# Patient Record
Sex: MALE | Race: WHITE | NOT HISPANIC OR LATINO | URBAN - METROPOLITAN AREA
[De-identification: names, ages, dates, MRNs, and addresses within clinical notes are randomized per-mention and may not be internally consistent; named-entity substitution may affect disease eponyms.]

---

## 2023-11-05 ENCOUNTER — HOSPITAL ENCOUNTER (EMERGENCY)
Facility: CLINIC | Age: 43
Discharge: HOME | End: 2023-11-05
Attending: EMERGENCY MEDICINE | Admitting: EMERGENCY MEDICINE
Payer: COMMERCIAL

## 2023-11-05 ENCOUNTER — APPOINTMENT (OUTPATIENT)
Dept: GENERAL RADIOLOGY | Facility: CLINIC | Age: 43
End: 2023-11-05
Attending: EMERGENCY MEDICINE
Payer: COMMERCIAL

## 2023-11-05 VITALS
RESPIRATION RATE: 18 BRPM | HEART RATE: 70 BPM | OXYGEN SATURATION: 99 % | TEMPERATURE: 98.4 F | SYSTOLIC BLOOD PRESSURE: 152 MMHG | DIASTOLIC BLOOD PRESSURE: 87 MMHG

## 2023-11-05 DIAGNOSIS — L03.011 CELLULITIS OF RIGHT THUMB: ICD-10-CM

## 2023-11-05 PROCEDURE — 73130 X-RAY EXAM OF HAND: CPT | Mod: RT

## 2023-11-05 PROCEDURE — 99284 EMERGENCY DEPT VISIT MOD MDM: CPT | Performed by: EMERGENCY MEDICINE

## 2023-11-05 PROCEDURE — 99283 EMERGENCY DEPT VISIT LOW MDM: CPT | Performed by: EMERGENCY MEDICINE

## 2023-11-05 PROCEDURE — 250N000013 HC RX MED GY IP 250 OP 250 PS 637: Performed by: EMERGENCY MEDICINE

## 2023-11-05 RX ORDER — CLINDAMYCIN HCL 300 MG
300 CAPSULE ORAL 4 TIMES DAILY
Qty: 40 CAPSULE | Refills: 0 | Status: ON HOLD | OUTPATIENT
Start: 2023-11-05 | End: 2023-11-12

## 2023-11-05 RX ORDER — CLINDAMYCIN HCL 150 MG
300 CAPSULE ORAL ONCE
Status: COMPLETED | OUTPATIENT
Start: 2023-11-05 | End: 2023-11-05

## 2023-11-05 RX ORDER — HYDROXYZINE HYDROCHLORIDE 25 MG/1
50 TABLET, FILM COATED ORAL ONCE
Status: COMPLETED | OUTPATIENT
Start: 2023-11-05 | End: 2023-11-05

## 2023-11-05 RX ADMIN — CLINDAMYCIN HYDROCHLORIDE 300 MG: 150 CAPSULE ORAL at 15:28

## 2023-11-05 RX ADMIN — HYDROXYZINE HYDROCHLORIDE 50 MG: 25 TABLET, FILM COATED ORAL at 15:28

## 2023-11-05 ASSESSMENT — ENCOUNTER SYMPTOMS
ARTHRALGIAS: 1
FLANK PAIN: 0
LIGHT-HEADEDNESS: 0
CHILLS: 0
FEVER: 0
MYALGIAS: 0
NERVOUS/ANXIOUS: 1
EYE REDNESS: 0
BACK PAIN: 0
NUMBNESS: 0
HEADACHES: 0
CONFUSION: 0
NECK STIFFNESS: 0
AGITATION: 0
COUGH: 0
PALPITATIONS: 0
SORE THROAT: 0
DIARRHEA: 0
CHEST TIGHTNESS: 0
WEAKNESS: 0
NAUSEA: 0
BLOOD IN STOOL: 0
SHORTNESS OF BREATH: 0
ABDOMINAL PAIN: 0
EYE DISCHARGE: 0
SEIZURES: 0
DYSURIA: 0
VOMITING: 0
FREQUENCY: 0

## 2023-11-05 ASSESSMENT — ACTIVITIES OF DAILY LIVING (ADL)
ADLS_ACUITY_SCORE: 35
ADLS_ACUITY_SCORE: 35

## 2023-11-05 NOTE — ED PROVIDER NOTES
History     Chief Complaint   Patient presents with    Wound Check    Anxiety     HPI  Rolly Perera is a 43 year old male who is being treated at [facility] since 11/2/2023 for cocaine, opioid, and benzodiazepine abuse.  He reports a lesion on his right hand which has become increasingly red and tender.  Patient reports to me that he is freaking out because he has an infection and would like something to settle him down.  Patient did not get phenobarb earlier today due to feeling like he was overly sedated, and he is currently on Suboxone, BuSpar, and Trintellix.  He denies fever.  There is no nausea or vomiting.  There is no numbness or weakness.  He denies any injury.    Allergies:  Not on File    Problem List:    There are no problems to display for this patient.       Past Medical History:    No past medical history on file.    Past Surgical History:    No past surgical history on file.    Family History:    No family history on file.    Social History:  Marital Status:  Single [1]        Medications:    clindamycin (CLEOCIN) 300 MG capsule          Review of Systems   Constitutional:  Negative for chills and fever.   HENT:  Negative for sore throat.    Eyes:  Negative for discharge and redness.   Respiratory:  Negative for cough, chest tightness and shortness of breath.    Cardiovascular:  Negative for chest pain, palpitations and leg swelling.   Gastrointestinal:  Negative for abdominal pain, blood in stool, diarrhea, nausea and vomiting.   Genitourinary:  Negative for dysuria, flank pain and frequency.   Musculoskeletal:  Positive for arthralgias. Negative for back pain, myalgias and neck stiffness.   Skin:  Negative for pallor.   Neurological:  Negative for seizures, weakness, light-headedness, numbness and headaches.   Psychiatric/Behavioral:  Negative for agitation and confusion. The patient is nervous/anxious.        Physical Exam   BP: (!) 156/80  Pulse: 74  Temp: 97.7  F (36.5  C)  Resp: 16  SpO2:  100 %      Physical Exam  Vitals and nursing note reviewed.   Constitutional:       General: He is not in acute distress.     Appearance: He is well-developed. He is not diaphoretic.   HENT:      Head: Normocephalic and atraumatic.   Eyes:      General: No scleral icterus.     Pupils: Pupils are equal, round, and reactive to light.   Cardiovascular:      Rate and Rhythm: Normal rate and regular rhythm.      Heart sounds: Normal heart sounds. No murmur heard.  Pulmonary:      Effort: No respiratory distress.      Breath sounds: No stridor. No wheezing or rales.   Abdominal:      General: Bowel sounds are normal.      Palpations: Abdomen is soft.      Tenderness: There is no abdominal tenderness.   Musculoskeletal:         General: Tenderness present.        Hands:       Cervical back: Normal range of motion and neck supple.   Skin:     General: Skin is warm and dry.      Coloration: Skin is not pale.      Findings: No erythema or rash.   Neurological:      Mental Status: He is alert and oriented to person, place, and time.      Sensory: No sensory deficit.      Coordination: Coordination normal.         ED Course                 Procedures                Results for orders placed or performed during the hospital encounter of 11/05/23 (from the past 24 hour(s))   XR Hand Right G/E 3 Views    Narrative    EXAM: XR HAND RIGHT G/E 3 VIEWS  LOCATION: [facility]  DATE: 11/5/2023    INDICATION: Infection right hand  COMPARISON: None.      Impression    IMPRESSION: Normal joint spaces and alignment. No acute fracture. No radiographic evidence for osteomyelitis.       Medications   hydrOXYzine (ATARAX) tablet 50 mg (50 mg Oral $Given 11/5/23 1528)   clindamycin (CLEOCIN) capsule 300 mg (300 mg Oral $Given 11/5/23 1528)       Assessments & Plan (with Medical Decision Making)     I have reviewed the nursing notes.    I have reviewed the findings, diagnosis, plan and need for follow up with the  patient.    43 year old male who is being treated at [facility] since 11/2/2023 for cocaine, opioid, and benzodiazepine abuse.  He reports a lesion on his right hand which has become increasingly red and tender.  Patient reports to me that he is freaking out because he has an infection and would like something to settle him down.  Patient did not get phenobarb earlier today due to feeling like he was overly sedated, and he is currently on Suboxone, BuSpar, and Trintellix.     Patient blood pressure is 156/80 with a temperature 97.7 and pulse rate of 74.  Respirations are 16 with O2 saturations 100%.  He appears mildly anxious.  His right thumb at the IP joint has a blood blister with surrounding erythema and lymphangitic spread.  He has good distal CMS.    An x-ray was performed and independently read by me.  There is no evidence of foreign body or osteomyelitis.  There is no fracture.    Patient was given hydroxyzine and clindamycin.  He will continue on antibiotics.  He will return if he becomes more systemically ill.  [facility] can manage his withdrawal symptoms and anxiety    Medical Decision Making  The patient's presentation was of moderate complexity (an undiagnosed new problem with uncertain diagnosis).    The patient's evaluation involved:  review of 1 test result(s) ordered prior to this encounter (see separate area of note for details)    The patient's management necessitated moderate risk (prescription drug management including medications given in the ED).        New Prescriptions    CLINDAMYCIN (CLEOCIN) 300 MG CAPSULE    Take 1 capsule (300 mg) by mouth 4 times daily for 10 days       Final diagnoses:   Cellulitis of right thumb       11/5/2023   [facility] EMERGENCY DEPT       Zeeshan Campbell MD  11/05/23 0216

## 2023-11-05 NOTE — DISCHARGE INSTRUCTIONS
Warm soaks of the right hand for 15 minutes 3 times a day  Take the antibiotic as prescribed  Tylenol or ibuprofen as needed for discomfort

## 2023-11-05 NOTE — ED TRIAGE NOTES
Pt sent from [facility] to go to  to get a wound on his finger looked at.  provider states that he's not acting right and was sent to ER. Called [facility] who states that he hsa been there since 11/2 for opiate, benzo and cocaine withdrawal. He has taken his suboxone, buspar and trintelix but did not get any phenobarb today d/t sedation

## 2023-11-09 ENCOUNTER — HOSPITAL ENCOUNTER (EMERGENCY)
Facility: CLINIC | Age: 43
Discharge: HOME | End: 2023-11-09
Payer: COMMERCIAL

## 2023-11-09 VITALS
HEART RATE: 82 BPM | TEMPERATURE: 97.8 F | DIASTOLIC BLOOD PRESSURE: 74 MMHG | WEIGHT: 109 LBS | OXYGEN SATURATION: 98 % | SYSTOLIC BLOOD PRESSURE: 120 MMHG | RESPIRATION RATE: 18 BRPM

## 2023-11-09 DIAGNOSIS — L02.519 CELLULITIS AND ABSCESS OF HAND: ICD-10-CM

## 2023-11-09 DIAGNOSIS — L03.119 CELLULITIS AND ABSCESS OF HAND: ICD-10-CM

## 2023-11-09 PROCEDURE — 10060 I&D ABSCESS SIMPLE/SINGLE: CPT

## 2023-11-09 PROCEDURE — 99213 OFFICE O/P EST LOW 20 MIN: CPT | Mod: 25

## 2023-11-09 PROCEDURE — G0463 HOSPITAL OUTPT CLINIC VISIT: HCPCS | Mod: 25

## 2023-11-09 RX ORDER — SULFAMETHOXAZOLE/TRIMETHOPRIM 800-160 MG
1 TABLET ORAL 2 TIMES DAILY
Qty: 14 TABLET | Refills: 0 | Status: ON HOLD | OUTPATIENT
Start: 2023-11-09 | End: 2023-11-12

## 2023-11-09 ASSESSMENT — ACTIVITIES OF DAILY LIVING (ADL): ADLS_ACUITY_SCORE: 35

## 2023-11-09 NOTE — ED TRIAGE NOTES
Patient states he has been on antibx for cellulitis of thumb and does not as though it is getting better

## 2023-11-09 NOTE — DISCHARGE INSTRUCTIONS
Continue with warm soaks to the area.  We will add Bactrim today. Continue the clindamycin. Return for new or worsening symptoms as we discussed.  Did perform a small I&D of the area clean out a small abscess that was present.

## 2023-11-10 ENCOUNTER — APPOINTMENT (OUTPATIENT)
Dept: CT IMAGING | Facility: CLINIC | Age: 43
End: 2023-11-10
Attending: EMERGENCY MEDICINE
Payer: COMMERCIAL

## 2023-11-10 ENCOUNTER — HOSPITAL ENCOUNTER (OUTPATIENT)
Facility: CLINIC | Age: 43
Setting detail: OBSERVATION
Discharge: HOME | End: 2023-11-12
Attending: EMERGENCY MEDICINE | Admitting: EMERGENCY MEDICINE
Payer: COMMERCIAL

## 2023-11-10 DIAGNOSIS — L03.011 CELLULITIS OF RIGHT THUMB: ICD-10-CM

## 2023-11-10 DIAGNOSIS — L02.511 ABSCESS OF RIGHT THUMB: ICD-10-CM

## 2023-11-10 PROBLEM — R07.9 CHEST PAIN: Status: ACTIVE | Noted: 2023-04-23

## 2023-11-10 PROBLEM — F32.A DEPRESSION: Status: ACTIVE | Noted: 2023-04-20

## 2023-11-10 PROBLEM — M62.82 NON-TRAUMATIC RHABDOMYOLYSIS: Status: ACTIVE | Noted: 2023-04-20

## 2023-11-10 PROBLEM — N17.9 AKI (ACUTE KIDNEY INJURY) (H): Status: ACTIVE | Noted: 2023-04-20

## 2023-11-10 PROBLEM — F11.90 OPIATE USE: Status: ACTIVE | Noted: 2023-04-20

## 2023-11-10 PROBLEM — F41.9 ANXIETY: Status: ACTIVE | Noted: 2023-04-20

## 2023-11-10 LAB
ALBUMIN SERPL BCG-MCNC: 4.5 G/DL (ref 3.5–5.2)
ALP SERPL-CCNC: 91 U/L (ref 40–129)
ALT SERPL W P-5'-P-CCNC: 56 U/L (ref 0–70)
ANION GAP SERPL CALCULATED.3IONS-SCNC: 12 MMOL/L (ref 7–15)
AST SERPL W P-5'-P-CCNC: 41 U/L (ref 0–45)
BASOPHILS # BLD AUTO: 0 10E3/UL (ref 0–0.2)
BASOPHILS NFR BLD AUTO: 0 %
BILIRUB SERPL-MCNC: 0.2 MG/DL
BUN SERPL-MCNC: 9.3 MG/DL (ref 6–20)
CALCIUM SERPL-MCNC: 9.2 MG/DL (ref 8.6–10)
CHLORIDE SERPL-SCNC: 100 MMOL/L (ref 98–107)
CREAT SERPL-MCNC: 0.89 MG/DL (ref 0.67–1.17)
DEPRECATED HCO3 PLAS-SCNC: 24 MMOL/L (ref 22–29)
EGFRCR SERPLBLD CKD-EPI 2021: >90 ML/MIN/1.73M2
EOSINOPHIL # BLD AUTO: 0.2 10E3/UL (ref 0–0.7)
EOSINOPHIL NFR BLD AUTO: 2 %
ERYTHROCYTE [DISTWIDTH] IN BLOOD BY AUTOMATED COUNT: 13.4 % (ref 10–15)
GLUCOSE SERPL-MCNC: 88 MG/DL (ref 70–99)
HCT VFR BLD AUTO: 36.7 % (ref 40–53)
HGB BLD-MCNC: 12.6 G/DL (ref 13.3–17.7)
IMM GRANULOCYTES # BLD: 0 10E3/UL
IMM GRANULOCYTES NFR BLD: 0 %
LACTATE SERPL-SCNC: 1.4 MMOL/L (ref 0.7–2)
LYMPHOCYTES # BLD AUTO: 1.8 10E3/UL (ref 0.8–5.3)
LYMPHOCYTES NFR BLD AUTO: 20 %
MCH RBC QN AUTO: 28.4 PG (ref 26.5–33)
MCHC RBC AUTO-ENTMCNC: 34.3 G/DL (ref 31.5–36.5)
MCV RBC AUTO: 83 FL (ref 78–100)
MONOCYTES # BLD AUTO: 0.8 10E3/UL (ref 0–1.3)
MONOCYTES NFR BLD AUTO: 8 %
NEUTROPHILS # BLD AUTO: 6.3 10E3/UL (ref 1.6–8.3)
NEUTROPHILS NFR BLD AUTO: 70 %
NRBC # BLD AUTO: 0 10E3/UL
NRBC BLD AUTO-RTO: 0 /100
PLATELET # BLD AUTO: 284 10E3/UL (ref 150–450)
POTASSIUM SERPL-SCNC: 4 MMOL/L (ref 3.4–5.3)
PROT SERPL-MCNC: 6.9 G/DL (ref 6.4–8.3)
RBC # BLD AUTO: 4.44 10E6/UL (ref 4.4–5.9)
SODIUM SERPL-SCNC: 136 MMOL/L (ref 135–145)
WBC # BLD AUTO: 9.1 10E3/UL (ref 4–11)

## 2023-11-10 PROCEDURE — 250N000013 HC RX MED GY IP 250 OP 250 PS 637: Performed by: EMERGENCY MEDICINE

## 2023-11-10 PROCEDURE — 96375 TX/PRO/DX INJ NEW DRUG ADDON: CPT | Mod: 59

## 2023-11-10 PROCEDURE — 96368 THER/DIAG CONCURRENT INF: CPT

## 2023-11-10 PROCEDURE — 80053 COMPREHEN METABOLIC PANEL: CPT | Performed by: EMERGENCY MEDICINE

## 2023-11-10 PROCEDURE — 85025 COMPLETE CBC W/AUTO DIFF WBC: CPT | Performed by: EMERGENCY MEDICINE

## 2023-11-10 PROCEDURE — 99285 EMERGENCY DEPT VISIT HI MDM: CPT | Mod: 25

## 2023-11-10 PROCEDURE — 73201 CT UPPER EXTREMITY W/DYE: CPT | Mod: RT

## 2023-11-10 PROCEDURE — 258N000003 HC RX IP 258 OP 636: Performed by: EMERGENCY MEDICINE

## 2023-11-10 PROCEDURE — 250N000011 HC RX IP 250 OP 636: Performed by: EMERGENCY MEDICINE

## 2023-11-10 PROCEDURE — 87040 BLOOD CULTURE FOR BACTERIA: CPT | Mod: 59 | Performed by: EMERGENCY MEDICINE

## 2023-11-10 PROCEDURE — 250N000009 HC RX 250: Performed by: EMERGENCY MEDICINE

## 2023-11-10 PROCEDURE — 96365 THER/PROPH/DIAG IV INF INIT: CPT | Mod: 59

## 2023-11-10 PROCEDURE — 36415 COLL VENOUS BLD VENIPUNCTURE: CPT | Performed by: EMERGENCY MEDICINE

## 2023-11-10 PROCEDURE — 96361 HYDRATE IV INFUSION ADD-ON: CPT

## 2023-11-10 PROCEDURE — 83605 ASSAY OF LACTIC ACID: CPT | Performed by: EMERGENCY MEDICINE

## 2023-11-10 PROCEDURE — 250N000011 HC RX IP 250 OP 636: Mod: JZ | Performed by: EMERGENCY MEDICINE

## 2023-11-10 PROCEDURE — 99285 EMERGENCY DEPT VISIT HI MDM: CPT | Performed by: EMERGENCY MEDICINE

## 2023-11-10 PROCEDURE — 10060 I&D ABSCESS SIMPLE/SINGLE: CPT

## 2023-11-10 RX ORDER — CEFAZOLIN SODIUM 1 G/50ML
1750 SOLUTION INTRAVENOUS ONCE
Status: COMPLETED | OUTPATIENT
Start: 2023-11-10 | End: 2023-11-11

## 2023-11-10 RX ORDER — KETOROLAC TROMETHAMINE 15 MG/ML
15 INJECTION, SOLUTION INTRAMUSCULAR; INTRAVENOUS ONCE
Status: COMPLETED | OUTPATIENT
Start: 2023-11-10 | End: 2023-11-10

## 2023-11-10 RX ORDER — CEFAZOLIN SODIUM 1 G/50ML
1250 SOLUTION INTRAVENOUS EVERY 12 HOURS
Status: DISCONTINUED | OUTPATIENT
Start: 2023-11-11 | End: 2023-11-12 | Stop reason: HOSPADM

## 2023-11-10 RX ORDER — CEFAZOLIN SODIUM 2 G/100ML
2 INJECTION, SOLUTION INTRAVENOUS EVERY 8 HOURS
Status: DISCONTINUED | OUTPATIENT
Start: 2023-11-10 | End: 2023-11-11

## 2023-11-10 RX ORDER — CEFAZOLIN SODIUM 1 G/50ML
1250 SOLUTION INTRAVENOUS EVERY 12 HOURS
Status: DISCONTINUED | OUTPATIENT
Start: 2023-11-11 | End: 2023-11-10

## 2023-11-10 RX ORDER — IOPAMIDOL 755 MG/ML
100 INJECTION, SOLUTION INTRAVASCULAR ONCE
Status: COMPLETED | OUTPATIENT
Start: 2023-11-10 | End: 2023-11-10

## 2023-11-10 RX ORDER — ACETAMINOPHEN 500 MG
1000 TABLET ORAL ONCE
Status: COMPLETED | OUTPATIENT
Start: 2023-11-10 | End: 2023-11-10

## 2023-11-10 RX ADMIN — KETOROLAC TROMETHAMINE 15 MG: 15 INJECTION, SOLUTION INTRAMUSCULAR; INTRAVENOUS at 22:45

## 2023-11-10 RX ADMIN — SODIUM CHLORIDE, POTASSIUM CHLORIDE, SODIUM LACTATE AND CALCIUM CHLORIDE 1000 ML: 600; 310; 30; 20 INJECTION, SOLUTION INTRAVENOUS at 22:45

## 2023-11-10 RX ADMIN — CEFAZOLIN SODIUM 2 G: 2 INJECTION, SOLUTION INTRAVENOUS at 23:26

## 2023-11-10 RX ADMIN — IOPAMIDOL 100 ML: 755 INJECTION, SOLUTION INTRAVENOUS at 22:24

## 2023-11-10 RX ADMIN — ACETAMINOPHEN 1000 MG: 500 TABLET, FILM COATED ORAL at 22:46

## 2023-11-10 RX ADMIN — VANCOMYCIN HYDROCHLORIDE 1750 MG: 1 INJECTION, POWDER, LYOPHILIZED, FOR SOLUTION INTRAVENOUS at 23:56

## 2023-11-10 RX ADMIN — SODIUM CHLORIDE 100 ML: 9 INJECTION, SOLUTION INTRAVENOUS at 22:24

## 2023-11-10 ASSESSMENT — ENCOUNTER SYMPTOMS
FATIGUE: 0
ABDOMINAL PAIN: 0
APPETITE CHANGE: 0
WEAKNESS: 0
NUMBNESS: 0
NAUSEA: 0
FEVER: 0
WOUND: 1
CHILLS: 0
CHEST TIGHTNESS: 0
COUGH: 0
SHORTNESS OF BREATH: 0

## 2023-11-10 ASSESSMENT — ACTIVITIES OF DAILY LIVING (ADL): ADLS_ACUITY_SCORE: 35

## 2023-11-10 NOTE — ED TRIAGE NOTES
Has been at [facility] x 8 days. Swelling and redness to right thumb. Is already taking bactrum and clindamycin. Getting worse.

## 2023-11-10 NOTE — ED NOTES
Presents to urgent care for concern of hand pain, swelling, and infection.  Patient was initially seen on 11/5/2023 was diagnosed with cellulitis of the right thumb.  She was placed on clindamycin at that time.  Patient returned on 11/10/2023 for symptoms that were not improving.  Patient was evaluated by this provider and a small abscess was found.  I&D was performed and and Bactrim was added to the clindamycin.  Patient was discharged in good condition.  Patient returns again today with worsening symptoms including increased swelling, redness, and pain.  Given his worsening symptoms on dual antibiotic treatment, advised patient he should be seen in the emergency department for further evaluation and treatment beyond the capabilities of the urgent care.  Patient is agreeable to this at this time.     Keith Ahuja, SANDRA CNP  11/10/23 154

## 2023-11-10 NOTE — ED PROVIDER NOTES
History     Chief Complaint   Patient presents with    Wound Check     [facility]  Rolly Perera is a 43 year old male who presents urgent care for concern of wound check.  Patient was seen 4 days ago in the emergency department and diagnosed with a cellulitis of the right thumb.  At that time a x-ray was completed and there was no concern for osteomyelitis but no fractures were seen.  Patient was placed on clindamycin.  Patient is currently residing at [facility].  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  He denies any history of IV drug use.  Patient returns today stating no improvement in the infection.  States that the blister that was present before has since opened up and he has had some moderate draining intermittently.  He does report he has been doing warm soaks.  He denies any fever, chills or other infectious symptoms.  No additional complaints at this time.    Allergies:  Not on File    Problem List:    There are no problems to display for this patient.       Past Medical History:    No past medical history on file.    Past Surgical History:    No past surgical history on file.    Family History:    No family history on file.    Social History:  Marital Status:  Single [1]        Medications:    sulfamethoxazole-trimethoprim (BACTRIM DS) 800-160 MG tablet  clindamycin (CLEOCIN) 300 MG capsule          Review of Systems   All other systems reviewed and are negative.    See HPI    Physical Exam   BP: 120/74  Pulse: 82  Temp: 97.8  F (36.6  C)  Resp: 18  Weight: 49.4 kg (109 lb)  SpO2: 98 %      Physical Exam  Constitutional:       General: He is not in acute distress.     Appearance: He is well-developed. He is not diaphoretic.   HENT:      Head: Normocephalic and atraumatic.      Nose: No congestion.      Mouth/Throat:      Mouth: Mucous membranes are moist.   Eyes:      General: No scleral icterus.     Conjunctiva/sclera: Conjunctivae  normal.   Cardiovascular:      Rate and Rhythm: Normal rate.   Pulmonary:      Effort: Pulmonary effort is normal.   Abdominal:      General: Abdomen is flat.   Musculoskeletal:        Hands:       Cervical back: Normal range of motion and neck supple.      Comments: Oozing wound over the IP joints of the right thumb with notable surrounding erythema.  Mild amount of fluctuance noted under the area of the oozing wound.   Skin:     General: Skin is warm and dry.      Capillary Refill: Capillary refill takes less than 2 seconds.      Findings: No rash.   Neurological:      Mental Status: He is alert and oriented to person, place, and time.         ED Course        Consulted with ER physician Dr. Head about the patient.  Dr. Head did personally see the patient and perform a bedside ultrasound to assess for abscess.         [facility]    PROCEDURE: -Incision/Drainage    Date/Time: 11/9/2023 9:09 PM    Performed by: Keith Ahuja APRN CNP  Authorized by: Keith Ahuja APRN CNP    Risks, benefits and alternatives discussed.      LOCATION:      Type:  Abscess    Size:  1cm    Location:  Upper extremity    Upper extremity location:  Hand    Hand location:  R hand    PROCEDURE TYPE:     Complexity:  Simple    ANESTHESIA (see MAR for exact dosages):     Anesthesia method:  Local infiltration    Local anesthetic:  Bupivacaine 0.5% WITH epi    PROCEDURE DETAILS:     Scalpel blade:  10    Wound management:  Probed and deloculated and irrigated with saline    Drainage:  Bloody and purulent    Drainage amount:  Scant    Wound treatment:  Wound left open    Packing materials:  None             No results found for this or any previous visit (from the past 24 hour(s)).    Medications - No data to display    Assessments & Plan (with Medical Decision Making)   Patient was seen 4 days ago in the emergency department and diagnosed with a cellulitis of the right thumb.  At that time a x-ray was  completed and there was no concern for osteomyelitis but no fractures were seen.  Patient was placed on clindamycin.  Patient is currently residing at [facility].  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  He denies any history of IV drug use.  Patient returns today stating no improvement in the infection.  States that the blister that was present before has since opened up and he has had some moderate draining intermittently.  He does report he has been doing warm soaks.  He denies any fever, chills or other infectious symptoms.  No additional complaints at this time.    Patient is afebrile on arrival his vital signs are otherwise reassuring at this time.  Patient has full mobility of the affected right thumb.  He is noted to have a oozing wound with notable cellulitis surrounding.  There is some mild fluctuance and small abscess was noted and ultrasound performed by ER physician Dr. Head at the bedside.  I&D was performed as above and scant amount of bloody and purulent discharge was retrieved.  Will provide additional coverage with Bactrim at this time in addition to the clindamycin.  I am not concerned for a flexor tenosynovitis at this time.  Should continue with warm soaks.  Return precautions were reviewed.  Patient was discharged in good condition and he is agreeable to the above plan.    I have reviewed the nursing notes.    I have reviewed the findings, diagnosis, plan and need for follow up with the patient.        Discharge Medication List as of 11/9/2023  2:55 PM        START taking these medications    Details   sulfamethoxazole-trimethoprim (BACTRIM DS) 800-160 MG tablet Take 1 tablet by mouth 2 times daily for 7 days, Disp-14 tablet, R-0, E-Prescribe             Final diagnoses:   Cellulitis and abscess of hand       11/9/2023   [facility] EMERGENCY DEPT    Disclaimer:  This note consists of symbols derived from  keyboarding, dictation and/or voice recognition software.  As a result, there may be errors in the script that have gone undetected.  Please consider this when interpreting information found in this chart.         Keith Ahuja APRN CNP  11/09/23 [id number]

## 2023-11-11 ENCOUNTER — ANCILLARY PROCEDURE (OUTPATIENT)
Dept: ULTRASOUND IMAGING | Facility: CLINIC | Age: 43
End: 2023-11-11
Attending: EMERGENCY MEDICINE
Payer: COMMERCIAL

## 2023-11-11 PROBLEM — L02.511 ABSCESS OF RIGHT THUMB: Status: ACTIVE | Noted: 2023-11-11

## 2023-11-11 PROBLEM — L03.011 CELLULITIS OF RIGHT THUMB: Status: ACTIVE | Noted: 2023-11-11

## 2023-11-11 LAB
CREAT SERPL-MCNC: 0.74 MG/DL (ref 0.67–1.17)
EGFRCR SERPLBLD CKD-EPI 2021: >90 ML/MIN/1.73M2

## 2023-11-11 PROCEDURE — 250N000011 HC RX IP 250 OP 636: Performed by: EMERGENCY MEDICINE

## 2023-11-11 PROCEDURE — 87070 CULTURE OTHR SPECIMN AEROBIC: CPT | Performed by: EMERGENCY MEDICINE

## 2023-11-11 PROCEDURE — 250N000013 HC RX MED GY IP 250 OP 250 PS 637: Performed by: INTERNAL MEDICINE

## 2023-11-11 PROCEDURE — 36415 COLL VENOUS BLD VENIPUNCTURE: CPT | Performed by: EMERGENCY MEDICINE

## 2023-11-11 PROCEDURE — 258N000003 HC RX IP 258 OP 636: Performed by: EMERGENCY MEDICINE

## 2023-11-11 PROCEDURE — 87077 CULTURE AEROBIC IDENTIFY: CPT | Performed by: EMERGENCY MEDICINE

## 2023-11-11 PROCEDURE — 96376 TX/PRO/DX INJ SAME DRUG ADON: CPT

## 2023-11-11 PROCEDURE — 96361 HYDRATE IV INFUSION ADD-ON: CPT

## 2023-11-11 PROCEDURE — 250N000013 HC RX MED GY IP 250 OP 250 PS 637: Performed by: EMERGENCY MEDICINE

## 2023-11-11 PROCEDURE — G0378 HOSPITAL OBSERVATION PER HR: HCPCS

## 2023-11-11 PROCEDURE — 82565 ASSAY OF CREATININE: CPT | Performed by: EMERGENCY MEDICINE

## 2023-11-11 PROCEDURE — 250N000011 HC RX IP 250 OP 636: Mod: JZ | Performed by: EMERGENCY MEDICINE

## 2023-11-11 PROCEDURE — 99222 1ST HOSP IP/OBS MODERATE 55: CPT | Mod: AI | Performed by: INTERNAL MEDICINE

## 2023-11-11 PROCEDURE — 26010 DRAINAGE OF FINGER ABSCESS: CPT | Mod: F5 | Performed by: EMERGENCY MEDICINE

## 2023-11-11 PROCEDURE — 87075 CULTR BACTERIA EXCEPT BLOOD: CPT | Performed by: EMERGENCY MEDICINE

## 2023-11-11 PROCEDURE — 96366 THER/PROPH/DIAG IV INF ADDON: CPT

## 2023-11-11 RX ORDER — IBUPROFEN 200 MG
600 TABLET ORAL EVERY 6 HOURS PRN
COMMUNITY

## 2023-11-11 RX ORDER — BUPROPION HYDROCHLORIDE 150 MG/1
1 TABLET ORAL EVERY MORNING
COMMUNITY
Start: 2023-11-07

## 2023-11-11 RX ORDER — BUPRENORPHINE AND NALOXONE 8; 2 MG/1; MG/1
2 FILM, SOLUBLE BUCCAL; SUBLINGUAL DAILY
Status: DISCONTINUED | OUTPATIENT
Start: 2023-11-11 | End: 2023-11-12 | Stop reason: HOSPADM

## 2023-11-11 RX ORDER — IPRATROPIUM BROMIDE 42 UG/1
2 SPRAY, METERED NASAL 3 TIMES DAILY PRN
COMMUNITY
Start: 2023-11-07

## 2023-11-11 RX ORDER — KETOROLAC TROMETHAMINE 15 MG/ML
15 INJECTION, SOLUTION INTRAMUSCULAR; INTRAVENOUS EVERY 6 HOURS PRN
Status: DISCONTINUED | OUTPATIENT
Start: 2023-11-11 | End: 2023-11-12 | Stop reason: HOSPADM

## 2023-11-11 RX ORDER — IBUPROFEN 400 MG/1
400 TABLET, FILM COATED ORAL EVERY 6 HOURS PRN
Status: DISCONTINUED | OUTPATIENT
Start: 2023-11-11 | End: 2023-11-12 | Stop reason: HOSPADM

## 2023-11-11 RX ORDER — PANTOPRAZOLE SODIUM 40 MG/1
40 TABLET, DELAYED RELEASE ORAL DAILY
Status: DISCONTINUED | OUTPATIENT
Start: 2023-11-11 | End: 2023-11-12 | Stop reason: HOSPADM

## 2023-11-11 RX ORDER — BUSPIRONE HYDROCHLORIDE 15 MG/1
15 TABLET ORAL 3 TIMES DAILY
Status: DISCONTINUED | OUTPATIENT
Start: 2023-11-11 | End: 2023-11-12 | Stop reason: HOSPADM

## 2023-11-11 RX ORDER — IBUPROFEN 400 MG/1
400 TABLET, FILM COATED ORAL EVERY 4 HOURS PRN
Status: DISCONTINUED | OUTPATIENT
Start: 2023-11-11 | End: 2023-11-11

## 2023-11-11 RX ORDER — KETOROLAC TROMETHAMINE 15 MG/ML
15 INJECTION, SOLUTION INTRAMUSCULAR; INTRAVENOUS ONCE
Status: COMPLETED | OUTPATIENT
Start: 2023-11-11 | End: 2023-11-11

## 2023-11-11 RX ORDER — METOPROLOL SUCCINATE 100 MG/1
150 TABLET, EXTENDED RELEASE ORAL DAILY PRN
COMMUNITY
Start: 2023-10-29

## 2023-11-11 RX ORDER — BUPRENORPHINE HYDROCHLORIDE AND NALOXONE HYDROCHLORIDE DIHYDRATE 8; 2 MG/1; MG/1
2 TABLET SUBLINGUAL DAILY
COMMUNITY
Start: 2023-10-04

## 2023-11-11 RX ORDER — KETOROLAC TROMETHAMINE 15 MG/ML
15 INJECTION, SOLUTION INTRAMUSCULAR; INTRAVENOUS EVERY 6 HOURS PRN
Status: DISCONTINUED | OUTPATIENT
Start: 2023-11-11 | End: 2023-11-11

## 2023-11-11 RX ORDER — LURASIDONE HYDROCHLORIDE 60 MG/1
60 TABLET, FILM COATED ORAL DAILY
COMMUNITY

## 2023-11-11 RX ORDER — LURASIDONE HYDROCHLORIDE 20 MG/1
60 TABLET, FILM COATED ORAL DAILY
Status: DISCONTINUED | OUTPATIENT
Start: 2023-11-11 | End: 2023-11-12 | Stop reason: HOSPADM

## 2023-11-11 RX ORDER — MIRTAZAPINE 45 MG/1
1 TABLET, FILM COATED ORAL AT BEDTIME
COMMUNITY
Start: 2023-10-23

## 2023-11-11 RX ORDER — ACETAMINOPHEN 500 MG
500-1000 TABLET ORAL EVERY 6 HOURS PRN
Status: DISCONTINUED | OUTPATIENT
Start: 2023-11-11 | End: 2023-11-12 | Stop reason: HOSPADM

## 2023-11-11 RX ORDER — ATOMOXETINE 40 MG/1
40 CAPSULE ORAL DAILY
Status: DISCONTINUED | OUTPATIENT
Start: 2023-11-11 | End: 2023-11-12 | Stop reason: HOSPADM

## 2023-11-11 RX ORDER — PANTOPRAZOLE SODIUM 40 MG/1
40 TABLET, DELAYED RELEASE ORAL DAILY
COMMUNITY

## 2023-11-11 RX ORDER — BUPROPION HYDROCHLORIDE 150 MG/1
150 TABLET ORAL EVERY MORNING
Status: DISCONTINUED | OUTPATIENT
Start: 2023-11-12 | End: 2023-11-12 | Stop reason: HOSPADM

## 2023-11-11 RX ORDER — ATOMOXETINE 40 MG/1
40 CAPSULE ORAL DAILY
COMMUNITY
Start: 2023-11-07

## 2023-11-11 RX ORDER — BUSPIRONE HYDROCHLORIDE 15 MG/1
1 TABLET ORAL 3 TIMES DAILY
COMMUNITY
Start: 2023-10-30

## 2023-11-11 RX ORDER — SUMATRIPTAN 100 MG/1
100 TABLET, FILM COATED ORAL
COMMUNITY
Start: 2023-09-22

## 2023-11-11 RX ORDER — BUPRENORPHINE HYDROCHLORIDE AND NALOXONE HYDROCHLORIDE DIHYDRATE 8; 2 MG/1; MG/1
2 TABLET SUBLINGUAL DAILY
Status: DISCONTINUED | OUTPATIENT
Start: 2023-11-11 | End: 2023-11-11

## 2023-11-11 RX ORDER — ACETAMINOPHEN 325 MG/1
325-650 TABLET ORAL EVERY 6 HOURS PRN
COMMUNITY

## 2023-11-11 RX ORDER — MIRTAZAPINE 15 MG/1
45 TABLET, FILM COATED ORAL AT BEDTIME
Status: DISCONTINUED | OUTPATIENT
Start: 2023-11-11 | End: 2023-11-12 | Stop reason: HOSPADM

## 2023-11-11 RX ADMIN — ATOMOXETINE 40 MG: 40 CAPSULE ORAL at 15:05

## 2023-11-11 RX ADMIN — BUPRENORPHINE AND NALOXONE 2 FILM: 8; 2 FILM BUCCAL; SUBLINGUAL at 10:18

## 2023-11-11 RX ADMIN — VORTIOXETINE 20 MG: 20 TABLET, FILM COATED ORAL at 15:04

## 2023-11-11 RX ADMIN — BUSPIRONE HYDROCHLORIDE 15 MG: 15 TABLET ORAL at 19:48

## 2023-11-11 RX ADMIN — KETOROLAC TROMETHAMINE 15 MG: 15 INJECTION, SOLUTION INTRAMUSCULAR; INTRAVENOUS at 08:20

## 2023-11-11 RX ADMIN — CEFAZOLIN SODIUM 2 G: 2 INJECTION, SOLUTION INTRAVENOUS at 06:50

## 2023-11-11 RX ADMIN — VANCOMYCIN HYDROCHLORIDE 1250 MG: 5 INJECTION, POWDER, LYOPHILIZED, FOR SOLUTION INTRAVENOUS at 10:17

## 2023-11-11 RX ADMIN — VANCOMYCIN HYDROCHLORIDE 1250 MG: 5 INJECTION, POWDER, LYOPHILIZED, FOR SOLUTION INTRAVENOUS at 22:47

## 2023-11-11 RX ADMIN — PANTOPRAZOLE SODIUM 40 MG: 40 TABLET, DELAYED RELEASE ORAL at 15:05

## 2023-11-11 RX ADMIN — CEFAZOLIN SODIUM 2 G: 2 INJECTION, SOLUTION INTRAVENOUS at 15:02

## 2023-11-11 RX ADMIN — BUSPIRONE HYDROCHLORIDE 15 MG: 15 TABLET ORAL at 15:04

## 2023-11-11 RX ADMIN — LURASIDONE HYDROCHLORIDE 60 MG: 20 TABLET, FILM COATED ORAL at 15:05

## 2023-11-11 RX ADMIN — MIRTAZAPINE 45 MG: 15 TABLET, FILM COATED ORAL at 22:46

## 2023-11-11 ASSESSMENT — ACTIVITIES OF DAILY LIVING (ADL)
ADLS_ACUITY_SCORE: 35
ADLS_ACUITY_SCORE: 18
ADLS_ACUITY_SCORE: 18
ADLS_ACUITY_SCORE: 35
ADLS_ACUITY_SCORE: 18
ADLS_ACUITY_SCORE: 35
ADLS_ACUITY_SCORE: 35
ADLS_ACUITY_SCORE: 18
ADLS_ACUITY_SCORE: 35
ADLS_ACUITY_SCORE: 18
ADLS_ACUITY_SCORE: 18
ADLS_ACUITY_SCORE: 35

## 2023-11-11 NOTE — PROGRESS NOTES
"[facility] ADMISSION NOTE    Patient admitted to room 2407 at approximately 1130 via cart from emergency room. Patient was accompanied by nurse.     Verbal SBAR report received from (this writer) prior to patient arrival.     Patient ambulated to bed independently. Patient alert and oriented X 3. Pain is controlled without any medications.  . Admission vital signs: Blood pressure 128/77, pulse 71, temperature 98.5  F (36.9  C), temperature source Oral, resp. rate 16, height 1.702 m (5' 7\"), weight 90.7 kg (200 lb), SpO2 99%. Patient was oriented to plan of care, call light, bed controls, tv, telephone, bathroom, and visiting hours.     Risk Assessment    The following safety risks were identified during admission: none. Yellow risk band applied: NO.     Skin Initial Assessment    This writer admitted this patient and completed a full skin assessment and Gonzalo score in the Adult PCS flowsheet. Appropriate interventions initiated as needed.     Secondary skin check completed by (pt refused.) declines any other open area other than thumb    Gonzalo Risk Assessment  Sensory Perception: 4-->no impairment  Moisture: 4-->rarely moist  Activity: 4-->walks frequently  Mobility: 4-->no limitation  Nutrition: 4-->excellent  Friction and Shear: 3-->no apparent problem  Gonzalo Score: 23  Mattress: Low air loss (GERALDINE pump, Isolibrium, Skin Guard, Pulsate, Isoair, etc.)  Bed Frame: Standard width and length    Education    Patient has a Friend to Observation order: No  Observation education completed and documented: N/A      Vanesa Brooke RN      "

## 2023-11-11 NOTE — ED PROVIDER NOTES
History     Chief Complaint   Patient presents with    Wound Infection     HPI  Rolly Perera is a 43 year old male with history of MRSA in the past presenting for evaluation of increasing pain and swelling of his right thumb.  Was first seen November 5 and then again yesterday for the same problem.  Patient has been put on clindamycin and Bactrim but continues to have worsening swelling and pain.  Denies fevers or chills.  Reports pain is now extending up to his right wrist as well.  Patient concerned about the worsening infection despite antibiotics and came back for further evaluation.  He is currently resident at [place] where he is receiving treatment for cocaine abuse.  Denies any history of diabetes.  Reports that wound started as a small scratch on his thumb.  No known trauma.    ==================================================================    CHART REVIEW:      Study Result    Narrative & Impression   EXAM: XR HAND RIGHT G/E 3 VIEWS  LOCATION: [facility]  DATE: 11/5/2023     INDICATION: Infection right hand  COMPARISON: None.                                                                      IMPRESSION: Normal joint spaces and alignment. No acute fracture. No radiographic evidence for osteomyelitis.       END CHART REVIEW  ==================================================================      Allergies:  No Known Allergies    Problem List:    Patient Active Problem List    Diagnosis Date Noted    Chest pain 04/23/2023     Priority: Medium    Anxiety 04/20/2023     Priority: Medium    DERRICK (acute kidney injury) (H24) 04/20/2023     Priority: Medium    Depression 04/20/2023     Priority: Medium    Non-traumatic rhabdomyolysis 04/20/2023     Priority: Medium    Opiate use 04/20/2023     Priority: Medium        Past Medical History:    No past medical history on file.    Past Surgical History:    No past surgical history on file.    Family History:    No family history on  "file.    Social History:  Marital Status:  Single [1]        Medications:    clindamycin (CLEOCIN) 300 MG capsule  sulfamethoxazole-trimethoprim (BACTRIM DS) 800-160 MG tablet          Review of Systems   Constitutional:  Negative for appetite change, chills, fatigue and fever.   HENT:  Negative for congestion.    Respiratory:  Negative for cough, chest tightness and shortness of breath.    Cardiovascular:  Negative for chest pain.   Gastrointestinal:  Negative for abdominal pain and nausea.   Skin:  Positive for wound (right thumb).   Neurological:  Negative for weakness and numbness.   All other systems reviewed and are negative.      Physical Exam   BP: 133/88  Pulse: 107  Temp: 98.9  F (37.2  C)  Resp: 16  Height: 170.2 cm (5' 7\")  Weight: 90.7 kg (200 lb)  SpO2: 100 %      Physical Exam  Vitals and nursing note reviewed.   Constitutional:       Appearance: Normal appearance. He is not ill-appearing.   HENT:      Head: Atraumatic.      Mouth/Throat:      Mouth: Mucous membranes are moist.   Eyes:      Conjunctiva/sclera: Conjunctivae normal.   Cardiovascular:      Rate and Rhythm: Normal rate.      Pulses: Normal pulses.   Pulmonary:      Effort: Pulmonary effort is normal.   Musculoskeletal:      Right hand: Swelling and tenderness (right thumb) present. Decreased range of motion.      Comments: See photos   Skin:     General: Skin is warm and dry.      Capillary Refill: Capillary refill takes less than 2 seconds.   Neurological:      Mental Status: He is alert and oriented to person, place, and time.   Psychiatric:         Mood and Affect: Mood normal.                   ED Course                 Procedures           Results for orders placed or performed during the hospital encounter of 11/10/23 (from the past 24 hour(s))   CBC with platelets differential    Narrative    The following orders were created for panel order CBC with platelets differential.  Procedure                               Abnormality       "   Status                     ---------                               -----------         ------                     CBC with platelets and d...[[id number]]  Abnormal            Final result                 Please view results for these tests on the individual orders.   Lactic acid whole blood   Result Value Ref Range    Lactic Acid 1.4 0.7 - 2.0 mmol/L   Comprehensive metabolic panel   Result Value Ref Range    Sodium 136 135 - 145 mmol/L    Potassium 4.0 3.4 - 5.3 mmol/L    Carbon Dioxide (CO2) 24 22 - 29 mmol/L    Anion Gap 12 7 - 15 mmol/L    Urea Nitrogen 9.3 6.0 - 20.0 mg/dL    Creatinine 0.89 0.67 - 1.17 mg/dL    GFR Estimate >90 >60 mL/min/1.73m2    Calcium 9.2 8.6 - 10.0 mg/dL    Chloride 100 98 - 107 mmol/L    Glucose 88 70 - 99 mg/dL    Alkaline Phosphatase 91 40 - 129 U/L    AST 41 0 - 45 U/L    ALT 56 0 - 70 U/L    Protein Total 6.9 6.4 - 8.3 g/dL    Albumin 4.5 3.5 - 5.2 g/dL    Bilirubin Total 0.2 <=1.2 mg/dL   CBC with platelets and differential   Result Value Ref Range    WBC Count 9.1 4.0 - 11.0 10e3/uL    RBC Count 4.44 4.40 - 5.90 10e6/uL    Hemoglobin 12.6 (L) 13.3 - 17.7 g/dL    Hematocrit 36.7 (L) 40.0 - 53.0 %    MCV 83 78 - 100 fL    MCH 28.4 26.5 - 33.0 pg    MCHC 34.3 31.5 - 36.5 g/dL    RDW 13.4 10.0 - 15.0 %    Platelet Count 284 150 - 450 10e3/uL    % Neutrophils 70 %    % Lymphocytes 20 %    % Monocytes 8 %    % Eosinophils 2 %    % Basophils 0 %    % Immature Granulocytes 0 %    NRBCs per 100 WBC 0 <1 /100    Absolute Neutrophils 6.3 1.6 - 8.3 10e3/uL    Absolute Lymphocytes 1.8 0.8 - 5.3 10e3/uL    Absolute Monocytes 0.8 0.0 - 1.3 10e3/uL    Absolute Eosinophils 0.2 0.0 - 0.7 10e3/uL    Absolute Basophils 0.0 0.0 - 0.2 10e3/uL    Absolute Immature Granulocytes 0.0 <=0.4 10e3/uL    Absolute NRBCs 0.0 10e3/uL   CT Hand Right w Contrast    Narrative    EXAM: CT HAND RIGHT W CONTRAST  LOCATION: [facility]  DATE: 11/10/2023    INDICATION: Worsening infection,  evaluate for osteomyelitis or deep space infection. Swelling.  COMPARISON: X-ray right hand 3 views 11/05/2023 at 1544 hours.  TECHNIQUE: IV contrast. Axial, sagittal and coronal thin-section reconstruction. Dose reduction techniques were used.   CONTRAST: 100 mL Isovue-370.    FINDINGS:    BONES:   -Anatomic alignment of the bones and joints of the right hand. No fracture, dislocation, erosive changes, lytic or destructive process identified.    SOFT TISSUES:  -Superficial soft tissue swelling adjacent to the right 1st MCP joint. Mildly enhancing superficial soft tissue fluid collection adjacent to the 1st MCP joint measures 2.0 x 1.3 x 2.9 cm (image 40, series 5, image 32, series 8, image 70, series 9),   worrisome for a superficial soft tissue abscess. This is located 0.4 cm deep to the skin surface. No opaque foreign body or soft tissue gas.      Impression    IMPRESSION:  1.  Superficial soft tissue inflammation and a small elongated subcutaneous enhancing fluid collection, worrisome for a superficial soft tissue abscess adjacent to the right 1st MCP joint.    2.  No fracture, dislocation or CT evidence of osteomyelitis.         Medications   ceFAZolin (ANCEF) 2 g in 100 mL D5W intermittent infusion (0 g Intravenous Stopped 11/10/23 2352)   vancomycin (VANCOCIN) 1,250 mg in sodium chloride 0.9 % 250 mL intermittent infusion (has no administration in time range)   lactated ringers BOLUS 1,000 mL (0 mLs Intravenous Stopped 11/11/23 0613)   iopamidol (ISOVUE-370) solution 100 mL (100 mLs Intravenous $Given 11/10/23 2224)   sodium chloride 0.9 % bag 500mL for CT scan flush use (100 mLs Intravenous $Given 11/10/23 2224)   ketorolac (TORADOL) injection 15 mg (15 mg Intravenous $Given 11/10/23 2245)   acetaminophen (TYLENOL) tablet 1,000 mg (1,000 mg Oral $Given 11/10/23 2246)   vancomycin (VANCOCIN) 1,750 mg in sodium chloride 0.9 % 500 mL intermittent infusion (1,750 mg Intravenous $Given 11/10/23 2356)     10:49  PM: Lactic normal.  I personally reviewed the CT.  There does appear to be a possible residual cyst abscess on the thumb.  White count normal and lactic acid is normal.  Given that he has been failing outpatient therapy, will start on IV cefazolin and vancomycin.  CT formal read pending.    6:00 AM Patient re-assessed: Patient feeling better.  Pain controlled.    6:20 AM: Patient was signed out at shift change to Dr Tam       Assessments & Plan (with Medical Decision Making)  43-year-old with history of MRSA presenting evaluation of increasing pain and swelling of his thumb.  Has been treated over the last 6 days with clindamycin and Bactrim but still getting worse.  Currently resident at [place] for treatment of cocaine abuse.  Denies IV drug abuse.  Due to worsening symptoms, imaging obtained along with labs to evaluate for deep space infection.  No evidence of osteomyelitis but did show some residual abscess.  Given the location, obtaining a good regional block would be difficult to facilitate drainage.  Abscess appears small.  Will initially treat with IV antibiotics, vancomycin and cefazolin.  No beds available overnight.  Signed out at shift change for admission.     I have reviewed the nursing notes.    I have reviewed the findings, diagnosis, plan and need for follow up with the patient.               New Prescriptions    No medications on file       Final diagnoses:   Cellulitis of right thumb   Abscess of right thumb       11/10/2023   [facility] EMERGENCY DEPT       Sanders, Boni Fleming MD  11/11/23 0622

## 2023-11-11 NOTE — PROGRESS NOTES
Right thumb, swollen, red, hot to touch and painful, I&D performed by physician, sent to lab. Toradol given for comfort, does not take narcotics. Receiving IV antibiotics, plan to admit

## 2023-11-11 NOTE — PROGRESS NOTES
Pt alert and oriented pleasant and cooperative.   Right thumb area red, with quarter size open area which was drained by ER Dr.   Wound cleaned with wound cleanser, mepelix lite with kerlix applied. States thumb, hand and forearm pain at a level 3, right arm elevated on pillows and ice applied.

## 2023-11-11 NOTE — ED PROVIDER NOTES
"  History     Chief Complaint   Patient presents with    Wound Infection     HPI  Emergency department signout note    43-year-old male with cellulitis and abscess of the right thumb being treated with IV antibiotics with plan for admission once a hospital bed is available.    Allergies:  No Known Allergies    Problem List:    Patient Active Problem List    Diagnosis Date Noted    Abscess of right thumb 11/11/2023     Priority: Medium    Cellulitis of right thumb 11/11/2023     Priority: Medium    Chest pain 04/23/2023     Priority: Medium    Anxiety 04/20/2023     Priority: Medium    DERRICK (acute kidney injury) (H24) 04/20/2023     Priority: Medium    Depression 04/20/2023     Priority: Medium    Non-traumatic rhabdomyolysis 04/20/2023     Priority: Medium    Opiate use 04/20/2023     Priority: Medium        Past Medical History:    No past medical history on file.    Past Surgical History:    No past surgical history on file.    Family History:    No family history on file.    Social History:  Marital Status:  Single [1]        Medications:    clindamycin (CLEOCIN) 300 MG capsule  sulfamethoxazole-trimethoprim (BACTRIM DS) 800-160 MG tablet          Review of Systems    Physical Exam   BP: 133/88  Pulse: 107  Temp: 98.9  F (37.2  C)  Resp: 16  Height: 170.2 cm (5' 7\")  Weight: 90.7 kg (200 lb)  SpO2: 100 %      Physical Exam  Erythema and swelling of the right thumb posteriorly with some drainage from prior incision and drainage.    ED Course                 [facility]    PROCEDURE: -Incision/Drainage    Date/Time: 11/11/2023 9:54 AM    Performed by: Jeramie Tam MD  Authorized by: Jeramie Tam MD    Risks, benefits and alternatives discussed.      LOCATION:      Type:  Abscess    Location:  Upper extremity    Upper extremity location:  Finger    Finger location:  R thumb    PRE-PROCEDURE DETAILS:     Skin preparation:  Antiseptic wash    PROCEDURE TYPE:     " Complexity:  Simple    ANESTHESIA (see MAR for exact dosages):     Anesthesia method:  Local infiltration    Local anesthetic:  Lidocaine 1% WITH epi    PROCEDURE DETAILS:     Needle aspiration: yes      Needle size:  18 G    Incision depth:  Dermal    Drainage:  Purulent and bloody    Drainage amount:  Moderate    Wound treatment:  Wound left open    Packing materials:  None    PROCEDURE    Patient Tolerance:  Patient tolerated the procedure well with no immediate complications      Results for orders placed during the hospital encounter of 11/10/23    POC US SOFT TISSUE    Impression  [facility] Procedure Note    Limited Bedside ED Ultrasound of Soft Tissue:    PROCEDURE: PERFORMED BY: Dr. Jeramie Tam MD  INDICATIONS/SYMPTOM: Skin redness, evaluate for abscess, cellulitis or foreign body  PROBE: High frequency linear probe  BODY LOCATION: Soft tissue located on right thumb    FINDINGS: Cobblestoning of soft tissue: present  Hypoechoic fluid (ie abscess) identified: present  US utilized to access fluid pocket with needle  INTERPRETATION:  The soft tissue and muscle layers were evaluated.  Findings indicate cellulitis and abscess    IMAGE DOCUMENTATION: Images were archived to PACs system.            Critical Care time:  none               Results for orders placed or performed during the hospital encounter of 11/10/23 (from the past 24 hour(s))   CBC with platelets differential    Narrative    The following orders were created for panel order CBC with platelets differential.  Procedure                               Abnormality         Status                     ---------                               -----------         ------                     CBC with platelets and d...[[id number]]  Abnormal            Final result                 Please view results for these tests on the individual orders.   Lactic acid whole blood   Result Value Ref Range    Lactic Acid 1.4 0.7 - 2.0 mmol/L   Comprehensive  metabolic panel   Result Value Ref Range    Sodium 136 135 - 145 mmol/L    Potassium 4.0 3.4 - 5.3 mmol/L    Carbon Dioxide (CO2) 24 22 - 29 mmol/L    Anion Gap 12 7 - 15 mmol/L    Urea Nitrogen 9.3 6.0 - 20.0 mg/dL    Creatinine 0.89 0.67 - 1.17 mg/dL    GFR Estimate >90 >60 mL/min/1.73m2    Calcium 9.2 8.6 - 10.0 mg/dL    Chloride 100 98 - 107 mmol/L    Glucose 88 70 - 99 mg/dL    Alkaline Phosphatase 91 40 - 129 U/L    AST 41 0 - 45 U/L    ALT 56 0 - 70 U/L    Protein Total 6.9 6.4 - 8.3 g/dL    Albumin 4.5 3.5 - 5.2 g/dL    Bilirubin Total 0.2 <=1.2 mg/dL   CBC with platelets and differential   Result Value Ref Range    WBC Count 9.1 4.0 - 11.0 10e3/uL    RBC Count 4.44 4.40 - 5.90 10e6/uL    Hemoglobin 12.6 (L) 13.3 - 17.7 g/dL    Hematocrit 36.7 (L) 40.0 - 53.0 %    MCV 83 78 - 100 fL    MCH 28.4 26.5 - 33.0 pg    MCHC 34.3 31.5 - 36.5 g/dL    RDW 13.4 10.0 - 15.0 %    Platelet Count 284 150 - 450 10e3/uL    % Neutrophils 70 %    % Lymphocytes 20 %    % Monocytes 8 %    % Eosinophils 2 %    % Basophils 0 %    % Immature Granulocytes 0 %    NRBCs per 100 WBC 0 <1 /100    Absolute Neutrophils 6.3 1.6 - 8.3 10e3/uL    Absolute Lymphocytes 1.8 0.8 - 5.3 10e3/uL    Absolute Monocytes 0.8 0.0 - 1.3 10e3/uL    Absolute Eosinophils 0.2 0.0 - 0.7 10e3/uL    Absolute Basophils 0.0 0.0 - 0.2 10e3/uL    Absolute Immature Granulocytes 0.0 <=0.4 10e3/uL    Absolute NRBCs 0.0 10e3/uL   CT Hand Right w Contrast    Narrative    EXAM: CT HAND RIGHT W CONTRAST  LOCATION: [facility]  DATE: 11/10/2023    INDICATION: Worsening infection, evaluate for osteomyelitis or deep space infection. Swelling.  COMPARISON: X-ray right hand 3 views 11/05/2023 at 1544 hours.  TECHNIQUE: IV contrast. Axial, sagittal and coronal thin-section reconstruction. Dose reduction techniques were used.   CONTRAST: 100 mL Isovue-370.    FINDINGS:    BONES:   -Anatomic alignment of the bones and joints of the right hand. No  fracture, dislocation, erosive changes, lytic or destructive process identified.    SOFT TISSUES:  -Superficial soft tissue swelling adjacent to the right 1st MCP joint. Mildly enhancing superficial soft tissue fluid collection adjacent to the 1st MCP joint measures 2.0 x 1.3 x 2.9 cm (image 40, series 5, image 32, series 8, image 70, series 9),   worrisome for a superficial soft tissue abscess. This is located 0.4 cm deep to the skin surface. No opaque foreign body or soft tissue gas.      Impression    IMPRESSION:  1.  Superficial soft tissue inflammation and a small elongated subcutaneous enhancing fluid collection, worrisome for a superficial soft tissue abscess adjacent to the right 1st MCP joint.    2.  No fracture, dislocation or CT evidence of osteomyelitis.     Creatinine   Result Value Ref Range    Creatinine 0.74 0.67 - 1.17 mg/dL    GFR Estimate >90 >60 mL/min/1.73m2   POC US SOFT TISSUE    Impression    [facility] Procedure Note     Limited Bedside ED Ultrasound of Soft Tissue:    PROCEDURE: PERFORMED BY: Dr. Jeramie Tam MD  INDICATIONS/SYMPTOM: Skin redness, evaluate for abscess, cellulitis or foreign body  PROBE: High frequency linear probe  BODY LOCATION: Soft tissue located on right thumb     FINDINGS: Cobblestoning of soft tissue: present   Hypoechoic fluid (ie abscess) identified: present   US utilized to access fluid pocket with needle  INTERPRETATION:  The soft tissue and muscle layers were evaluated.      Findings indicate cellulitis and abscess    IMAGE DOCUMENTATION: Images were archived to PACs system.          Medications   ceFAZolin (ANCEF) 2 g in 100 mL D5W intermittent infusion (0 g Intravenous Stopped 11/11/23 0914)   vancomycin (VANCOCIN) 1,250 mg in sodium chloride 0.9 % 250 mL intermittent infusion (has no administration in time range)   buprenorphine HCl-naloxone HCl (SUBOXONE) 8-2 MG per film 2 Film (has no administration in time range)   lactated ringers BOLUS  1,000 mL (0 mLs Intravenous Stopped 11/11/23 0613)   iopamidol (ISOVUE-370) solution 100 mL (100 mLs Intravenous $Given 11/10/23 2224)   sodium chloride 0.9 % bag 500mL for CT scan flush use (100 mLs Intravenous $Given 11/10/23 2224)   ketorolac (TORADOL) injection 15 mg (15 mg Intravenous $Given 11/10/23 2245)   acetaminophen (TYLENOL) tablet 1,000 mg (1,000 mg Oral $Given 11/10/23 2246)   vancomycin (VANCOCIN) 1,750 mg in sodium chloride 0.9 % 500 mL intermittent infusion (1,750 mg Intravenous $Given 11/10/23 2356)   ketorolac (TORADOL) injection 15 mg (15 mg Intravenous $Given 11/11/23 0820)       Assessments & Plan (with Medical Decision Making)   43-year-old male with cellulitis and abscess of the right thumb.  Ultrasound obtained by myself to access fluid collection.  Pus and blood drained from the area with some relief of his pain.  I did discuss the case with the on-call hospitalist, Dr. John.  We discussed ongoing management of the patient and he asked for a wound culture to be sent from the pus obtained from the incision and drainage.  He agrees to except the patient for further management.    I have reviewed the nursing notes.    I have reviewed the findings, diagnosis, plan and need for follow up with the patient.           New Prescriptions    No medications on file       Final diagnoses:   Cellulitis of right thumb   Abscess of right thumb       11/10/2023   [facility] EMERGENCY DEPT       Jeramie Tam MD  11/11/23 3558

## 2023-11-11 NOTE — H&P
"[facility]    History and Physical  Hospital Medicine       Date of Admission:  11/10/2023  Date of Service: 11/11/2023     Assessment & Plan   Rolly Perera is a 43 year old male with past medical history significant for polysubstance abuse, nontraumatic rhabdomyolysis in April, depression, anxiety, JUAN on CPAP who now presents from treatment center at [facility] on 11/10/2023 with worsening right thumb pain and swelling despite antibiotics.    Right thumb/hand wound with abscess and cellulitis  History of MRSA    Seen and ED 11/5 and started on clindamycin.  Return to ED 11/9 with worsening symptoms and Bactrim started for history of MRSA previously sensitive to Bactrim per patient.  After 1 day returns at 11/10 with still worsening symptoms.  There is palpable abscess to the base of the thumb but no evidence of tenosynovitis and abscess was aspirated with sample sent though after antibiotics started.    Failed oral clindamycin.  Was not on Bactrim long enough to collect a failure.  No sepsis.  Does not appear to have tendon involvement.  Started on cefazolin and vancomycin in the ED.   - Continue vancomycin for presumed MRSA infection  -Stop cefazolin  -Follow-up cultures  -Toradol, ibuprofen or acetaminophen for pain    Polysubstance abuse  Depression  Anxiety   -Continue PTA medications including Suboxone  -Return to [facility] on discharge      Clinically Significant Risk Factors Present on Admission                       # Obesity: Estimated body mass index is 31.32 kg/m  as calculated from the following:    Height as of this encounter: 1.702 m (5' 7\").    Weight as of this encounter: 90.7 kg (200 lb).              Diet: Regular Diet Adult    DVT Prophylaxis: Low Risk/Ambulatory with no VTE prophylaxis indicated  Cuellar Catheter: Not present  Lines: None     Code Status:  Full         Disposition: Anticipate discharge in 1-2 day(s) once infection improving. Appropriate " for observation    Roque John MD  [facility] Medicine        Primary Care Physician   System, Provider Not In None    History is obtained from the patient who is a fair historian, discussion with the ER physician and review of old records via the EMR.    History of Present Illness   Rolly Perera is a 43 year old male who presents with 5 days of worsening right thumb and hand swelling and pain despite antibiotics.  The patient was admitted to [facility] on 11/2 and a lesion on the dorsal aspect of the right thumb was noted by the nurse.  The patient was unaware and does not know how it started.  This began to look infected, and the patient was brought to the emergency department on 11/5 and discharged on clindamycin.  Despite the antibiotic, pain and swelling worsened and he returned to the ED on 11/9 and was started on Bactrim.  He returned a day later with again worsening symptoms.  No fevers or chills.  He reports history of MRSA positive in his nares but also has had MRSA positive infections in 2010.  He feels he has been doing well at [facility] otherwise and hopes to return there on discharge.    Prior to Admission Medications   Prior to Admission Medications   Prescriptions Last Dose Informant Patient Reported? Taking?   LATUDA 60 MG TABS tablet 11/10/2023 at 0730 Other Yes Yes   Sig: Take 60 mg by mouth daily   SUMAtriptan (IMITREX) 100 MG tablet Past Month at Unknown Self Yes Yes   Sig: Take 100 mg by mouth at onset of headache   acetaminophen (TYLENOL) 325 MG tablet 11/10/2023 at 1348 Other Yes Yes   Sig: Take 325-650 mg by mouth every 6 hours as needed for mild pain or headaches   atomoxetine (STRATTERA) 40 MG capsule 11/10/2023 at am Other Yes Yes   Sig: Take 40 mg by mouth daily   buPROPion (WELLBUTRIN XL) 150 MG 24 hr tablet 11/10/2023 at am Other Yes Yes   Sig: Take 1 tablet by mouth every morning   buprenorphine-naloxone (SUBOXONE) 8-2 MG SUBL sublingual tablet 11/10/2023 at am Other Yes Yes   Sig:  "Place 2 tablets under the tongue daily   busPIRone (BUSPAR) 15 MG tablet 11/10/2023 at 1125 Other Yes Yes   Sig: Take 1 tablet by mouth 3 times daily   clindamycin (CLEOCIN) 300 MG capsule 11/10/2023 at 1125 Other No Yes   Sig: Take 1 capsule (300 mg) by mouth 4 times daily for 10 days   ibuprofen (ADVIL/MOTRIN) 200 MG tablet 11/10/2023 at 1348 Other Yes Yes   Sig: Take 600 mg by mouth every 6 hours as needed for pain   ipratropium (ATROVENT) 0.06 % nasal spray Past Month at [facility] Self Yes Yes   Sig: Spray 2 sprays into both nostrils 3 times daily as needed for rhinitis   melatonin 5 MG tablet 11/9/2023 at 2248 Other Yes Yes   Sig: Take 5 mg by mouth nightly as needed for sleep   metoprolol succinate ER (TOPROL XL) 100 MG 24 hr tablet Past Month at on hold at [facility], Other Yes Yes   Sig: Take 150 mg by mouth daily as needed (adrenalin dump)   mirtazapine (REMERON) 45 MG tablet 11/9/2023 at 2100 Other Yes Yes   Sig: Take 1 tablet by mouth at bedtime   pantoprazole (PROTONIX) 40 MG EC tablet 11/10/2023 at am Other Yes Yes   Sig: Take 40 mg by mouth daily   sulfamethoxazole-trimethoprim (BACTRIM DS) 800-160 MG tablet 11/10/2023 at 0730 Other No Yes   Sig: Take 1 tablet by mouth 2 times daily for 7 days   vortioxetine (TRINTELLIX) 20 MG tablet 11/10/2023 at am Other Yes Yes   Sig: Take 20 mg by mouth daily      Facility-Administered Medications: None            Physical Exam   /71 (BP Location: Left arm)   Pulse 88   Temp 98.1  F (36.7  C) (Oral)   Resp 16   Ht 1.702 m (5' 7\")   Wt 90.7 kg (200 lb)   SpO2 96%   BMI 31.32 kg/m       Weight: 200 lbs 0 oz Body mass index is 31.32 kg/m .     Constitutional: Tired appearing but otherwise awake and interactive, oriented, cooperative, no apparent distress, appears nontoxic  Eyes: Eyes are mild to moderately injected  Lymph/Hematologic: No epitrochlear, axillary lymphadenopathy is appreciated  Cardiovascular: Regular rate and rhythm, normal S1 and S2, and " no murmur noted.  Good peripheral pulses in wrists bilaterally.   Respiratory: Clear to auscultation bilaterally  GI: Soft, non-tender, normal bowel sounds  Genitourinary: Deferred  Musculoskeletal: Normal muscle bulk and tone.  Moves right wrist without pain  Skin: Right thumb is currently wrapped.  (Pictures from ED reviewed)  Neurologic: Neck supple. Cranial nerves are grossly intact.  is symmetric.     Data   Data reviewed today:   Recent Labs   Lab 11/11/23  0647 11/10/23  2146   WBC  --  9.1   HGB  --  12.6*   MCV  --  83   PLT  --  284   NA  --  136   POTASSIUM  --  4.0   CHLORIDE  --  100   CO2  --  24   BUN  --  9.3   CR 0.74 0.89   ANIONGAP  --  12   EMILY  --  9.2   GLC  --  88   ALBUMIN  --  4.5   PROTTOTAL  --  6.9   BILITOTAL  --  0.2   ALKPHOS  --  91   ALT  --  56   AST  --  41       Recent Results (from the past 24 hour(s))   CT Hand Right w Contrast    Narrative    EXAM: CT HAND RIGHT W CONTRAST  LOCATION: [facility]  DATE: 11/10/2023    INDICATION: Worsening infection, evaluate for osteomyelitis or deep space infection. Swelling.  COMPARISON: X-ray right hand 3 views 11/05/2023 at 1544 hours.  TECHNIQUE: IV contrast. Axial, sagittal and coronal thin-section reconstruction. Dose reduction techniques were used.   CONTRAST: 100 mL Isovue-370.    FINDINGS:    BONES:   -Anatomic alignment of the bones and joints of the right hand. No fracture, dislocation, erosive changes, lytic or destructive process identified.    SOFT TISSUES:  -Superficial soft tissue swelling adjacent to the right 1st MCP joint. Mildly enhancing superficial soft tissue fluid collection adjacent to the 1st MCP joint measures 2.0 x 1.3 x 2.9 cm (image 40, series 5, image 32, series 8, image 70, series 9),   worrisome for a superficial soft tissue abscess. This is located 0.4 cm deep to the skin surface. No opaque foreign body or soft tissue gas.      Impression    IMPRESSION:  1.  Superficial soft  tissue inflammation and a small elongated subcutaneous enhancing fluid collection, worrisome for a superficial soft tissue abscess adjacent to the right 1st MCP joint.    2.  No fracture, dislocation or CT evidence of osteomyelitis.     POC US SOFT TISSUE    Impression    [facility] Procedure Note     Limited Bedside ED Ultrasound of Soft Tissue:    PROCEDURE: PERFORMED BY: Dr. Jeramie Tam MD  INDICATIONS/SYMPTOM: Skin redness, evaluate for abscess, cellulitis or foreign body  PROBE: High frequency linear probe  BODY LOCATION: Soft tissue located on right thumb     FINDINGS: Cobblestoning of soft tissue: present   Hypoechoic fluid (ie abscess) identified: present   US utilized to access fluid pocket with needle  INTERPRETATION:  The soft tissue and muscle layers were evaluated.      Findings indicate cellulitis and abscess    IMAGE DOCUMENTATION: Images were archived to PACs system.          I personally reviewed no images or EKG's today.    Medical Decision Making       35 MINUTES SPENT BY ME on the date of service doing chart review, history, exam, documentation & further activities per the note.        Roque John MD  [facility] Medicine

## 2023-11-11 NOTE — MEDICATION SCRIBE - ADMISSION MEDICATION HISTORY
Medication Scribe Admission Medication History    Admission medication history is complete. The information provided in this note is only as accurate as the sources available at the time of the update.    Information Source(s): Patient and Facility (TCU/NH/) medication list/MAR via  paperwork sent in from [facility] and by phone with them.  In room with patient.    Pertinent Information: Patient from [facility] and they are taking care of his medicines there.  PRN meds that patient had at home that are not on their list are Atrovent nasal spray, Sumatriptan and Metoprolol(patient states that he takes this as needed for adrenalin dump)    Changes made to PTA medication list:  Added:  Atrovent nasal spray, Sumatriptan, Metoprolol, Melatonin 5 mg, Mirtazapine 45 mg, Trintellix 20 mg, Pantoprazole 40 mg, Bupropion  mg, Buspirone 15 mg, Latuda 60 mg, Atomoxetine 40 mg, Suboxone 8-2 film, Tylenol, Ibuprofen.  Deleted: None  Changed: None    Medication Affordability:  Not including over the counter (OTC) medications, was there a time in the past 3 months when you did not take your medications as prescribed because of cost?: Unable to Assess (patient from [facility].)    Allergies reviewed with patient and updates made in EHR: yes, no change.    Medication History Completed By: Lucy Bella 11/11/2023 4:19 PM    PTA Med List   Medication Sig Last Dose    acetaminophen (TYLENOL) 325 MG tablet Take 325-650 mg by mouth every 6 hours as needed for mild pain or headaches 11/10/2023 at 1348    atomoxetine (STRATTERA) 40 MG capsule Take 40 mg by mouth daily 11/10/2023 at am    buprenorphine-naloxone (SUBOXONE) 8-2 MG SUBL sublingual tablet Place 2 tablets under the tongue daily 11/10/2023 at am    buPROPion (WELLBUTRIN XL) 150 MG 24 hr tablet Take 1 tablet by mouth every morning 11/10/2023 at am    busPIRone (BUSPAR) 15 MG tablet Take 1 tablet by mouth 3 times daily 11/10/2023 at 1125    clindamycin (CLEOCIN) 300 MG capsule  Take 1 capsule (300 mg) by mouth 4 times daily for 10 days 11/10/2023 at 1125    ibuprofen (ADVIL/MOTRIN) 200 MG tablet Take 600 mg by mouth every 6 hours as needed for pain 11/10/2023 at 1348    ipratropium (ATROVENT) 0.06 % nasal spray Spray 2 sprays into both nostrils 3 times daily as needed for rhinitis Past Month at Unknown    LATUDA 60 MG TABS tablet Take 60 mg by mouth daily 11/10/2023 at 0730    melatonin 5 MG tablet Take 5 mg by mouth nightly as needed for sleep 11/9/2023 at 2248    metoprolol succinate ER (TOPROL XL) 100 MG 24 hr tablet Take 150 mg by mouth daily as needed (adrenalin dump) Past Month at on hold at [facility]    mirtazapine (REMERON) 45 MG tablet Take 1 tablet by mouth at bedtime 11/9/2023 at 2100    pantoprazole (PROTONIX) 40 MG EC tablet Take 40 mg by mouth daily 11/10/2023 at am    sulfamethoxazole-trimethoprim (BACTRIM DS) 800-160 MG tablet Take 1 tablet by mouth 2 times daily for 7 days 11/10/2023 at 0730    SUMAtriptan (IMITREX) 100 MG tablet Take 100 mg by mouth at onset of headache Past Month at Unknown    vortioxetine (TRINTELLIX) 20 MG tablet Take 20 mg by mouth daily 11/10/2023 at am

## 2023-11-11 NOTE — PHARMACY-VANCOMYCIN DOSING SERVICE
Pharmacy Vancomycin Initial Note  Date of Service November 10, 2023  Patient's  1980  43 year old, male    Indication: Skin and Soft Tissue Infection    Current estimated CrCl = Estimated Creatinine Clearance: 116.1 mL/min (based on SCr of 0.89 mg/dL).    Creatinine for last 3 days  11/10/2023:  9:46 PM Creatinine 0.89 mg/dL    Recent Vancomycin Level(s) for last 3 days  No results found for requested labs within last 3 days.      Vancomycin IV Administrations (past 72 hours)        No vancomycin orders with administrations in past 72 hours.                    Nephrotoxins and other renal medications (From now, onward)      Start     Dose/Rate Route Frequency Ordered Stop    23 0000  vancomycin (VANCOCIN) 1,250 mg in sodium chloride 0.9 % 250 mL intermittent infusion         1,250 mg  over 90 Minutes Intravenous EVERY 12 HOURS 11/10/23 2256 25 2359    11/10/23 2300  vancomycin (VANCOCIN) 1,750 mg in sodium chloride 0.9 % 500 mL intermittent infusion         1,750 mg  over 2 Hours Intravenous ONCE 11/10/23 2249              Contrast Orders - past 72 hours (72h ago, onward)      Start     Dose/Rate Route Frequency Stop    11/10/23 2145  iopamidol (ISOVUE-370) solution 100 mL         100 mL Intravenous ONCE 11/10/23 2224            Insight[employer] Prediction of Planned Initial Vancomycin Regimen     Loading dose: 1750 mg at 23:00 11/10/2023.  Regimen: 1250 mg IV every 12 hours.  Start time: 22:55 on 11/10/2023  Exposure target: AUC24 (range)400-600 mg/L.hr   AUC24,ss: 492 mg/L.hr  Probability of AUC24 > 400: 71 %  Ctrough,ss: 15 mg/L  Probability of Ctrough,ss > 20: 28 %  Probability of nephrotoxicity (Lodise МАРИНА ): 10 %       Plan:  Start vancomycin  1750  mg IV  X1, then q1250 mg Q12 h   Vancomycin monitoring method: AUC  Vancomycin therapeutic monitoring goal: 400-600 mg*h/L  Pharmacy will check vancomycin levels as appropriate in 1-3 Days.    Serum creatinine levels will be ordered daily for the  first week of therapy and at least twice weekly for subsequent weeks.      Lana Eid, RP

## 2023-11-12 VITALS
TEMPERATURE: 98.5 F | HEART RATE: 68 BPM | SYSTOLIC BLOOD PRESSURE: 115 MMHG | WEIGHT: 200 LBS | OXYGEN SATURATION: 96 % | BODY MASS INDEX: 31.39 KG/M2 | RESPIRATION RATE: 20 BRPM | HEIGHT: 67 IN | DIASTOLIC BLOOD PRESSURE: 59 MMHG

## 2023-11-12 LAB
CREAT SERPL-MCNC: 0.7 MG/DL (ref 0.67–1.17)
EGFRCR SERPLBLD CKD-EPI 2021: >90 ML/MIN/1.73M2
HOLD SPECIMEN: NORMAL

## 2023-11-12 PROCEDURE — 258N000003 HC RX IP 258 OP 636: Performed by: EMERGENCY MEDICINE

## 2023-11-12 PROCEDURE — 82565 ASSAY OF CREATININE: CPT | Performed by: EMERGENCY MEDICINE

## 2023-11-12 PROCEDURE — 250N000013 HC RX MED GY IP 250 OP 250 PS 637: Performed by: INTERNAL MEDICINE

## 2023-11-12 PROCEDURE — 250N000011 HC RX IP 250 OP 636: Mod: JZ | Performed by: INTERNAL MEDICINE

## 2023-11-12 PROCEDURE — 99238 HOSP IP/OBS DSCHRG MGMT 30/<: CPT | Performed by: INTERNAL MEDICINE

## 2023-11-12 PROCEDURE — G0378 HOSPITAL OBSERVATION PER HR: HCPCS

## 2023-11-12 PROCEDURE — 250N000013 HC RX MED GY IP 250 OP 250 PS 637: Performed by: EMERGENCY MEDICINE

## 2023-11-12 PROCEDURE — 96376 TX/PRO/DX INJ SAME DRUG ADON: CPT

## 2023-11-12 PROCEDURE — 250N000011 HC RX IP 250 OP 636: Performed by: EMERGENCY MEDICINE

## 2023-11-12 PROCEDURE — 36415 COLL VENOUS BLD VENIPUNCTURE: CPT | Performed by: EMERGENCY MEDICINE

## 2023-11-12 RX ORDER — SULFAMETHOXAZOLE/TRIMETHOPRIM 800-160 MG
1 TABLET ORAL 2 TIMES DAILY
Start: 2023-11-12

## 2023-11-12 RX ADMIN — KETOROLAC TROMETHAMINE 15 MG: 15 INJECTION, SOLUTION INTRAMUSCULAR; INTRAVENOUS at 08:48

## 2023-11-12 RX ADMIN — VANCOMYCIN HYDROCHLORIDE 1250 MG: 5 INJECTION, POWDER, LYOPHILIZED, FOR SOLUTION INTRAVENOUS at 11:23

## 2023-11-12 RX ADMIN — LURASIDONE HYDROCHLORIDE 60 MG: 20 TABLET, FILM COATED ORAL at 11:18

## 2023-11-12 RX ADMIN — ATOMOXETINE 40 MG: 40 CAPSULE ORAL at 11:18

## 2023-11-12 RX ADMIN — ACETAMINOPHEN 1000 MG: 500 TABLET, FILM COATED ORAL at 08:47

## 2023-11-12 RX ADMIN — BUPRENORPHINE AND NALOXONE 2 FILM: 8; 2 FILM BUCCAL; SUBLINGUAL at 11:19

## 2023-11-12 RX ADMIN — BUSPIRONE HYDROCHLORIDE 15 MG: 15 TABLET ORAL at 08:48

## 2023-11-12 RX ADMIN — VORTIOXETINE 20 MG: 20 TABLET, FILM COATED ORAL at 08:52

## 2023-11-12 RX ADMIN — PANTOPRAZOLE SODIUM 40 MG: 40 TABLET, DELAYED RELEASE ORAL at 08:48

## 2023-11-12 RX ADMIN — BUPROPION HYDROCHLORIDE 150 MG: 150 TABLET, EXTENDED RELEASE ORAL at 08:47

## 2023-11-12 ASSESSMENT — ACTIVITIES OF DAILY LIVING (ADL)
ADLS_ACUITY_SCORE: 18

## 2023-11-12 NOTE — PLAN OF CARE
Problem: Adult Inpatient Plan of Care  Goal: Absence of Hospital-Acquired Illness or Injury  Intervention: Identify and Manage Fall Risk  Recent Flowsheet Documentation  Taken 11/12/2023 0005 by Jackie Perea, RN  Safety Promotion/Fall Prevention:   clutter free environment maintained   nonskid shoes/slippers when out of bed   patient and family education  Intervention: Prevent Skin Injury  Recent Flowsheet Documentation  Taken 11/12/2023 0005 by Jackie Perea, RN  Body Position: position changed independently  Patient is independent in room and able to make needs known. The patient has slept throughout most of the night.

## 2023-11-12 NOTE — PLAN OF CARE
Pt discharged to [facility] at 1440 with .  Vanco dose completed and dressing changed prior to discharge.  Pt to resume oral antibiotics tonight.  Report given to RN at [facility].

## 2023-11-12 NOTE — DISCHARGE SUMMARY
[facility]  Hospitalist Discharge Summary      Date of Admission:  11/10/2023  Date of Discharge:  11/12/2023  Discharging Provider: Roque John MD  Discharge Service: Hospitalist Service    Discharge Diagnoses   Cellulitis with abscess right thumb, suspect due to MRSA    Follow-ups Needed After Discharge   Follow-up Appointments     Follow-up and recommended labs and tests       Return to ED if worsening pain, swelling, and/or redness or development   of systemic symptoms such as fever or chills. Otherwise does not need   follow up if continues to improve and heal.            Unresulted Labs Ordered in the Past 30 Days of this Admission       Date and Time Order Name Status Description    11/11/2023  9:45 AM Abscess Aerobic Bacterial Culture Routine with Gram Stain Preliminary     11/11/2023  9:42 AM Anaerobic bacterial culture In process     11/10/2023  9:30 PM Blood Culture Peripheral Blood Preliminary     11/10/2023  9:30 PM Blood Culture Line, venous Preliminary         These results will be followed up by Dr. John if pertinent    Discharge Disposition   Discharged to [facility]  Condition at discharge: Stable    Hospital Course   Rolly Perera is a 43 year old male with past medical history significant for polysubstance abuse, nontraumatic rhabdomyolysis in April, depression, anxiety, JUAN on CPAP who now presents from treatment center at [facility] on 11/10/2023 with worsening right thumb pain and swelling despite antibiotics.    Right thumb/hand wound with abscess and cellulitis  History of MRSA    Seen and ED 11/5 and started on clindamycin.  Return to ED 11/9 with worsening symptoms and Bactrim started for history of MRSA previously sensitive to Bactrim per patient.  After 1 day returns at 11/10 with still worsening symptoms.  There is palpable abscess to the base of the thumb but no evidence of tenosynovitis and abscess was aspirated with sample sent  though after antibiotics started.    Failed oral clindamycin 5 days.  Was not on Bactrim long enough to be considered a treatment failure.  No sepsis.  Does not appear to have tendon involvement on exam.  Started on cefazolin and vancomycin in the ED.  The small fluid collection was aspirated in the ED with some symptom relief.    Continued vancomycin for presumed MRSA infection and stopped cefazolin on admission. Pain, erythema and swelling improving 11/12.  Suspect this is MRSA infection given history of prior infections and evidence of tissue destruction. Received 4 doses of IV vancomycin with clinical improvement seen. The trimethoprim-sulfamethoxazole that was started less than one day prior to this admission should still be an appropriate antibiotic so should resume the remaining doses of that script.    Polysubstance abuse  Depression  Anxiety   -Continue PTA medications including Suboxone  -Returning to [facility] on discharge      Consultations This Hospital Stay   None    Code Status   Full Code    Time Spent on this Encounter   I, Roque John MD, personally saw the patient today and spent less than or equal to 30 minutes discharging this patient.       Roque John MD  [facility] SURGICAL  [address] [address]  Phone: [phone]  Fax: [phone]  ______________________________________________________________________    Physical Exam   Vital Signs: Temp: 98.5  F (36.9  C) Temp src: Oral BP: 115/59 Pulse: 68   Resp: 20 SpO2: 96 % O2 Device: None (Room air)    Weight: 200 lbs 0 oz  Gen: alert and oriented, nontoxic, NAD  Right thumb/wrist/forearm: dorsum of thumb with some superficial tissue breakdown, erythema improved from ED photo, erythema in forearm largely resolved. Good ROM of right thumb and right wrist.        Primary Care Physician   Provider Not In System    Discharge Orders      Reason for your hospital stay    Right  thumb cellulitis with abscess that failed outpatient clindamycin. The small fluid collection was aspirated in the ED with some symptom relief. Suspect this is MRSA infection given history of prior infections and evidence of tissue destruction. Received 4 doses of IV vancomycin with clinical improvement seen. The trimethoprim-sulfamethoxazole that was started less than one day prior to this admission should still be an appropriate antibiotic so should resume the remaining doses of that script.     Follow-up and recommended labs and tests     Return to ED if worsening pain, swelling, and/or redness or development of systemic symptoms such as fever or chills. Otherwise does not need follow up if continues to improve and heal.     Activity    Your activity upon discharge: Limit use of right thumb as able while this resolves.     Diet    Follow this diet upon discharge:       Regular Diet Adult         Discharge Medications   Current Discharge Medication List        CONTINUE these medications which have CHANGED    Details   sulfamethoxazole-trimethoprim (BACTRIM DS) 800-160 MG tablet Take 1 tablet by mouth 2 times daily Resume previous script for 6 more days starting with PM dose today 11/12/2023    Associated Diagnoses: Cellulitis of right thumb; Abscess of right thumb           CONTINUE these medications which have NOT CHANGED    Details   acetaminophen (TYLENOL) 325 MG tablet Take 325-650 mg by mouth every 6 hours as needed for mild pain or headaches      atomoxetine (STRATTERA) 40 MG capsule Take 40 mg by mouth daily      buprenorphine-naloxone (SUBOXONE) 8-2 MG SUBL sublingual tablet Place 2 tablets under the tongue daily      buPROPion (WELLBUTRIN XL) 150 MG 24 hr tablet Take 1 tablet by mouth every morning      busPIRone (BUSPAR) 15 MG tablet Take 1 tablet by mouth 3 times daily      ibuprofen (ADVIL/MOTRIN) 200 MG tablet Take 600 mg by mouth every 6 hours as needed for pain      ipratropium (ATROVENT) 0.06 %  nasal spray Spray 2 sprays into both nostrils 3 times daily as needed for rhinitis      LATUDA 60 MG TABS tablet Take 60 mg by mouth daily      melatonin 5 MG tablet Take 5 mg by mouth nightly as needed for sleep      metoprolol succinate ER (TOPROL XL) 100 MG 24 hr tablet Take 150 mg by mouth daily as needed (adrenalin dump)      mirtazapine (REMERON) 45 MG tablet Take 1 tablet by mouth at bedtime      pantoprazole (PROTONIX) 40 MG EC tablet Take 40 mg by mouth daily      SUMAtriptan (IMITREX) 100 MG tablet Take 100 mg by mouth at onset of headache      vortioxetine (TRINTELLIX) 20 MG tablet Take 20 mg by mouth daily           STOP taking these medications       clindamycin (CLEOCIN) 300 MG capsule Comments:   Reason for Stopping:             Allergies   No Known Allergies

## 2023-11-13 ENCOUNTER — APPOINTMENT (OUTPATIENT)
Dept: ULTRASOUND IMAGING | Facility: CLINIC | Age: 43
End: 2023-11-13
Attending: FAMILY MEDICINE
Payer: COMMERCIAL

## 2023-11-13 ENCOUNTER — HOSPITAL ENCOUNTER (EMERGENCY)
Facility: CLINIC | Age: 43
Discharge: HOME | End: 2023-11-13
Attending: FAMILY MEDICINE | Admitting: FAMILY MEDICINE
Payer: COMMERCIAL

## 2023-11-13 VITALS
WEIGHT: 200 LBS | TEMPERATURE: 98.4 F | RESPIRATION RATE: 18 BRPM | OXYGEN SATURATION: 98 % | DIASTOLIC BLOOD PRESSURE: 68 MMHG | HEIGHT: 67 IN | SYSTOLIC BLOOD PRESSURE: 109 MMHG | HEART RATE: 84 BPM | BODY MASS INDEX: 31.39 KG/M2

## 2023-11-13 DIAGNOSIS — R21 RASH: ICD-10-CM

## 2023-11-13 LAB
BACTERIA ABSC ANAEROBE+AEROBE CULT: ABNORMAL
GRAM STAIN RESULT: ABNORMAL
GRAM STAIN RESULT: ABNORMAL

## 2023-11-13 PROCEDURE — 99284 EMERGENCY DEPT VISIT MOD MDM: CPT | Mod: 25

## 2023-11-13 PROCEDURE — 93971 EXTREMITY STUDY: CPT | Mod: RT

## 2023-11-13 PROCEDURE — 99283 EMERGENCY DEPT VISIT LOW MDM: CPT | Performed by: FAMILY MEDICINE

## 2023-11-13 ASSESSMENT — ACTIVITIES OF DAILY LIVING (ADL)
ADLS_ACUITY_SCORE: 35
ADLS_ACUITY_SCORE: 35

## 2023-11-13 NOTE — ED PROVIDER NOTES
HPI   Pt is a 42 yo male presenting with a red, hot, swollen lump on his right inner bicep.  He is currently at [facility] for polysubstance abuse.  He was seen initially here in the ED on 11/5 with hand cellulitis, given clindamycin.  He was seen again on 11/9 for worsened cellulitis, given Bactrim.  He was seen on 11/10 for presumed tenosynovitis and abscess.  He was started on vancomycin and received 4 doses in the hospital.  He had an aspiration of abscess.  He was given a prescription for Bactrim which she has been taking as directed.    The patient comes in today with a new swollen tender lump on his right inner bicep.  He recognized this starting this morning.  No chest pain or shortness of breath.  No fever.  He feels improved overall.  No chest pain.  No shortness of breath.  No trauma or injury.  The rash is localized only to his arm and is felt nowhere else.      Allergies:  No Known Allergies  Problem List:    Patient Active Problem List    Diagnosis Date Noted    Abscess of right thumb 11/11/2023     Priority: Medium    Cellulitis of right thumb 11/11/2023     Priority: Medium    Chest pain 04/23/2023     Priority: Medium    Anxiety 04/20/2023     Priority: Medium    DERRICK (acute kidney injury) (H24) 04/20/2023     Priority: Medium    Depression 04/20/2023     Priority: Medium    Non-traumatic rhabdomyolysis 04/20/2023     Priority: Medium    Opiate use 04/20/2023     Priority: Medium      Past Medical History:    No past medical history on file.  Past Surgical History:    No past surgical history on file.  Family History:    No family history on file.  Social History:  Marital Status:  Single [1]      Medications:    acetaminophen (TYLENOL) 325 MG tablet  atomoxetine (STRATTERA) 40 MG capsule  buprenorphine-naloxone (SUBOXONE) 8-2 MG SUBL sublingual tablet  buPROPion (WELLBUTRIN XL) 150 MG 24 hr tablet  busPIRone (BUSPAR) 15 MG tablet  ibuprofen (ADVIL/MOTRIN) 200 MG tablet  ipratropium (ATROVENT) 0.06 %  "nasal spray  LATUDA 60 MG TABS tablet  melatonin 5 MG tablet  metoprolol succinate ER (TOPROL XL) 100 MG 24 hr tablet  mirtazapine (REMERON) 45 MG tablet  pantoprazole (PROTONIX) 40 MG EC tablet  sulfamethoxazole-trimethoprim (BACTRIM DS) 800-160 MG tablet  SUMAtriptan (IMITREX) 100 MG tablet  vortioxetine (TRINTELLIX) 20 MG tablet      Review of Systems   All other systems reviewed and are negative.      PE   BP: 137/82  Pulse: 97  Temp: 98.7  F (37.1  C)  Resp: 16  Height: 170.2 cm (5' 7\")  Weight: 90.7 kg (200 lb)  SpO2: 98 %  Physical Exam  Vitals and nursing note reviewed.   Constitutional:       General: He is not in acute distress.  HENT:      Head: Atraumatic.      Right Ear: External ear normal.      Left Ear: External ear normal.      Nose: Nose normal.      Mouth/Throat:      Mouth: Mucous membranes are moist.      Pharynx: Oropharynx is clear.   Eyes:      General: No scleral icterus.     Extraocular Movements: Extraocular movements intact.      Conjunctiva/sclera: Conjunctivae normal.      Pupils: Pupils are equal, round, and reactive to light.   Cardiovascular:      Rate and Rhythm: Normal rate.   Pulmonary:      Effort: Pulmonary effort is normal. No respiratory distress.   Musculoskeletal:         General: Normal range of motion.      Cervical back: Normal range of motion.   Skin:     General: Skin is warm and dry.      Comments: There is a linear red, raised area along his distal inner bicep.  This is not fluctuant.  It is not obviously tender.  It is mildly hot to the touch.  No evidence of local trauma.  No other evidence of rash.   Neurological:      Mental Status: He is alert and oriented to person, place, and time.   Psychiatric:         Behavior: Behavior normal.         ED COURSE and [facility]   1950.  Patient has symptoms and signs as described above.  Patient with ultrasound showing no evidence of DVT or superficial thrombophlebitis.  I did take my own ultrasound and put it directly onto the area " of swelling and there was no underlying vasculature or skin abnormality.  Low concern for infection.  Low concern for abscess.  Low concern for superficial clot.  Low concern for DVT.  No evidence for diffuse hives.  Local reaction to something?    Electronic medical chart reviewed, including medical problems, medications, medical allergies, social history.  Recent hospitalizations and surgical procedures reviewed.  Recent clinic visits and consultations reviewed.  Recent labs and test results reviewed.  Nursing notes reviewed.    The patient, their parent if applicable, and/or their medical decision maker(s) and I have reviewed all of the available historical information, applicable PMH, physical exam findings, and objective diagnostic data gathered during this ED visit.  We then discussed all work-up options and then together agreed upon the course taken during this visit.  The ultimate disposition and plan was a cooperative decision made between myself and the patient, their parent if applicable, and/or their legal decision maker(s).  The risks and benefits of all decisions made during this visit were discussed to the best of my abilities given the circumstances, and all parties are understanding of the pertinent ramifications of these decisions.      LABS  Labs Ordered and Resulted from Time of ED Arrival to Time of ED Departure - No data to display    IMAGING  Images reviewed by me.  Radiology report also reviewed.  US Upper Extremity Venous Duplex Right   Final Result   IMPRESSION:   1.  No deep venous thrombosis in the right upper extremity.          Procedures    Medications - No data to display      IMPRESSION       ICD-10-CM    1. Rash  R21                Medication List      There are no discharge medications for this visit.                     Christian White MD  11/13/23 1952

## 2023-11-13 NOTE — ED NOTES
"Patient states this is his fourth time here for his \"thumb issue\". He has been hospitalized for antibx recently for the same concern. Today he presents with redness/warmth in right bicep area (same arm as thumb infection)  "

## 2023-11-14 NOTE — DISCHARGE INSTRUCTIONS
RETURN TO THE EMERGENCY ROOM FOR THE FOLLOWING:    Newly worsened pain, expanding redness/swelling/tenderness, fever greater than 101, or at anytime for any concern.    FOLLOW UP:    With your primary clinic only as needed.    TREATMENT RECOMMENDATIONS:    None new.  No changes.    NURSE ADVICE LINE:  [phone] or [phone]

## 2023-11-16 LAB
BACTERIA BLD CULT: NO GROWTH
BACTERIA BLD CULT: NO GROWTH

## 2023-11-18 LAB — BACTERIA ABSC ANAEROBE+AEROBE CULT: NORMAL
